# Patient Record
Sex: MALE | Race: ASIAN | Employment: OTHER | URBAN - METROPOLITAN AREA
[De-identification: names, ages, dates, MRNs, and addresses within clinical notes are randomized per-mention and may not be internally consistent; named-entity substitution may affect disease eponyms.]

---

## 2018-04-17 PROCEDURE — 81003 URINALYSIS AUTO W/O SCOPE: CPT | Performed by: INTERNAL MEDICINE

## 2021-09-07 ENCOUNTER — TELEPHONE (OUTPATIENT)
Dept: ORTHOPEDICS CLINIC | Facility: CLINIC | Age: 41
End: 2021-09-07

## 2021-09-07 NOTE — TELEPHONE ENCOUNTER
Patient is requesting to be seen by Dr. Caleb Arce as soon as possible for a possible torn right ACL. Can patient be double booked?

## 2021-09-08 ENCOUNTER — OFFICE VISIT (OUTPATIENT)
Dept: ORTHOPEDICS CLINIC | Facility: CLINIC | Age: 41
End: 2021-09-08
Payer: COMMERCIAL

## 2021-09-08 ENCOUNTER — TELEPHONE (OUTPATIENT)
Dept: ORTHOPEDICS CLINIC | Facility: CLINIC | Age: 41
End: 2021-09-08

## 2021-09-08 VITALS — OXYGEN SATURATION: 100 % | HEART RATE: 107 BPM

## 2021-09-08 DIAGNOSIS — S83.511A RUPTURE OF ANTERIOR CRUCIATE LIGAMENT OF RIGHT KNEE, INITIAL ENCOUNTER: Primary | ICD-10-CM

## 2021-09-08 PROCEDURE — 99205 OFFICE O/P NEW HI 60 MIN: CPT | Performed by: ORTHOPAEDIC SURGERY

## 2021-09-08 NOTE — TELEPHONE ENCOUNTER
Pt has an appt for possible torn ACL. No imagign on file. Pt will bring MRI results. Please advice, thanks.   Future Appointments   Date Time Provider Dwayne Suzette   9/8/2021  4:00 PM Viviane Hood MD [facility]

## 2021-09-08 NOTE — TELEPHONE ENCOUNTER
Will review imaging at the time of visit . Due to patient bringing MRI no further imaging needed at this time .

## 2021-09-09 NOTE — H&P
[facility] - ORTHOPEDICS  [id number]  [phone]     NEW PATIENT VISIT - HISTORY AND PHYSICAL EXAMINATION     Name: Frank Maldonado   MRN: [id number]  Date: 9/8/2021     CC: Right Knee 280 lb (127 kg). Physical Exam  Constitutional:       Appearance: Normal appearance. HENT:      Head: Normocephalic and atraumatic. Eyes:      Extraocular Movements: Extraocular movements intact.    Neck:      Musculoskeletal: Normal range of motion of structural integrity. There is contusions in the medial femoral condyle as well as the lateral tibial plateau consistent with a recent subluxation episode. Medial and lateral menisci are within normal limits. No evidence of meniscus pathology.   [address], [address] [facility]. Idalmis [email or web address]  t: [phone]  o: [phone]  f: [phone]        This note was dictated using Dragon software.   While it was briefly proofread prior to completion, some grammatical, spelling, and word c

## 2021-09-24 ENCOUNTER — OFFICE VISIT (OUTPATIENT)
Dept: ORTHOPEDICS CLINIC | Facility: CLINIC | Age: 41
End: 2021-09-24
Payer: COMMERCIAL

## 2021-09-24 DIAGNOSIS — S83.511A RUPTURE OF ANTERIOR CRUCIATE LIGAMENT OF RIGHT KNEE, INITIAL ENCOUNTER: Primary | ICD-10-CM

## 2021-09-24 PROCEDURE — 99215 OFFICE O/P EST HI 40 MIN: CPT | Performed by: ORTHOPAEDIC SURGERY
